# Patient Record
Sex: MALE | Race: WHITE
[De-identification: names, ages, dates, MRNs, and addresses within clinical notes are randomized per-mention and may not be internally consistent; named-entity substitution may affect disease eponyms.]

---

## 2019-06-10 ENCOUNTER — HOSPITAL ENCOUNTER (INPATIENT)
Dept: HOSPITAL 8 - ED | Age: 49
LOS: 1 days | Discharge: PSYCHIATRIC OR SUBSTANCE USE FACILITY | DRG: 876 | End: 2019-06-11
Attending: INTERNAL MEDICINE | Admitting: HOSPITALIST
Payer: MEDICAID

## 2019-06-10 VITALS — WEIGHT: 171.96 LBS | BODY MASS INDEX: 25.47 KG/M2 | HEIGHT: 69 IN

## 2019-06-10 VITALS — SYSTOLIC BLOOD PRESSURE: 110 MMHG | DIASTOLIC BLOOD PRESSURE: 77 MMHG

## 2019-06-10 VITALS — SYSTOLIC BLOOD PRESSURE: 100 MMHG | DIASTOLIC BLOOD PRESSURE: 64 MMHG

## 2019-06-10 DIAGNOSIS — F12.90: ICD-10-CM

## 2019-06-10 DIAGNOSIS — F32.2: Primary | ICD-10-CM

## 2019-06-10 DIAGNOSIS — Z59.0: ICD-10-CM
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
## 2019-06-10 DIAGNOSIS — S51.811A: ICD-10-CM

## 2019-06-10 DIAGNOSIS — S61.512A: ICD-10-CM

## 2019-06-10 DIAGNOSIS — S61.511A: ICD-10-CM
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
## 2019-06-10 DIAGNOSIS — S51.812A: ICD-10-CM

## 2019-06-10 LAB
ALBUMIN SERPL-MCNC: 3.8 G/DL (ref 3.4–5)
ALP SERPL-CCNC: 85 U/L (ref 45–117)
ALT SERPL-CCNC: 18 U/L (ref 12–78)
ANION GAP SERPL CALC-SCNC: 7 MMOL/L (ref 5–15)
BASOPHILS # BLD AUTO: 0.01 X10^3/UL (ref 0–0.1)
BASOPHILS NFR BLD AUTO: 0 % (ref 0–1)
BILIRUB SERPL-MCNC: 0.3 MG/DL (ref 0.2–1)
CALCIUM SERPL-MCNC: 8.8 MG/DL (ref 8.5–10.1)
CHLORIDE SERPL-SCNC: 111 MMOL/L (ref 98–107)
CREAT SERPL-MCNC: 0.9 MG/DL (ref 0.7–1.3)
EOSINOPHIL # BLD AUTO: 0.03 X10^3/UL (ref 0–0.4)
EOSINOPHIL NFR BLD AUTO: 0 % (ref 1–7)
ERYTHROCYTE [DISTWIDTH] IN BLOOD BY AUTOMATED COUNT: 13.2 % (ref 9.4–14.8)
LYMPHOCYTES # BLD AUTO: 1.6 X10^3/UL (ref 1–3.4)
LYMPHOCYTES NFR BLD AUTO: 15 % (ref 22–44)
MCH RBC QN AUTO: 30.4 PG (ref 27.5–34.5)
MCHC RBC AUTO-ENTMCNC: 33.4 G/DL (ref 33.2–36.2)
MCV RBC AUTO: 91.1 FL (ref 81–97)
MD: NO
MONOCYTES # BLD AUTO: 0.27 X10^3/UL (ref 0.2–0.8)
MONOCYTES NFR BLD AUTO: 3 % (ref 2–9)
NEUTROPHILS # BLD AUTO: 8.61 X10^3/UL (ref 1.8–6.8)
NEUTROPHILS NFR BLD AUTO: 82 % (ref 42–75)
PLATELET # BLD AUTO: 358 X10^3/UL (ref 130–400)
PMV BLD AUTO: 7.4 FL (ref 7.4–10.4)
PROT SERPL-MCNC: 7.4 G/DL (ref 6.4–8.2)
RBC # BLD AUTO: 4.96 X10^6/UL (ref 4.38–5.82)
VANCOMYCIN TROUGH SERPL-MCNC: 2.1 MG/DL (ref 2.8–20)

## 2019-06-10 PROCEDURE — 80053 COMPREHEN METABOLIC PANEL: CPT

## 2019-06-10 PROCEDURE — 84443 ASSAY THYROID STIM HORMONE: CPT

## 2019-06-10 PROCEDURE — 96372 THER/PROPH/DIAG INJ SC/IM: CPT

## 2019-06-10 PROCEDURE — 85025 COMPLETE CBC W/AUTO DIFF WBC: CPT

## 2019-06-10 PROCEDURE — 80307 DRUG TEST PRSMV CHEM ANLYZR: CPT

## 2019-06-10 PROCEDURE — 36415 COLL VENOUS BLD VENIPUNCTURE: CPT

## 2019-06-10 PROCEDURE — 90715 TDAP VACCINE 7 YRS/> IM: CPT

## 2019-06-10 PROCEDURE — 0JQH0ZZ REPAIR LEFT LOWER ARM SUBCUTANEOUS TISSUE AND FASCIA, OPEN APPROACH: ICD-10-PCS | Performed by: EMERGENCY MEDICINE

## 2019-06-10 PROCEDURE — 0JQG0ZZ REPAIR RIGHT LOWER ARM SUBCUTANEOUS TISSUE AND FASCIA, OPEN APPROACH: ICD-10-PCS | Performed by: EMERGENCY MEDICINE

## 2019-06-10 RX ADMIN — ACETAMINOPHEN PRN MG: 325 TABLET, FILM COATED ORAL at 16:54

## 2019-06-10 SDOH — ECONOMIC STABILITY - HOUSING INSECURITY: HOMELESSNESS: Z59.0

## 2019-06-10 NOTE — NUR
Provided report to DONNA Harkins on 2N. All questions answered. Pt ready to 
transfer to floor from ED.

## 2019-06-10 NOTE — NUR
Pt resting on gurney. Pt's brother at bedside. Pt gives verbal consent for his 
brother to be in the room. NADN. No needs expressed. Sitter near doorway in 
direct line of sight for observation.

## 2019-06-10 NOTE — NUR
ED tech at bedside placing dressing per EDPA verbal order on pt's bilateral 
forearms. NADN. Room remains secured for SI precautions. Pt provided water per 
request. Pt provided urinal and is aware urine sample is needed. Sitter near 
doorway in direct line of sight for observation. NADN. No other needs 
experssed.

## 2019-06-10 NOTE — NUR
LATE NOTE FOR 1330:



Pt resting on gurney. Pt has unlabored respirations with even chest rise and 
fall. Sitter near doorway in direct line of sight for observation. NADN. No 
needs expressed. Both bedrails up for safety measures.

## 2019-06-10 NOTE — NUR
Pt provided lunch tray by sinan. JACQUELINE. No other needs expressed at this time. 
Sitter near doorway in direct line of sight for observation.

## 2019-06-10 NOTE — NUR
Pt resting on gurney. NADN. Pt has unlabored respirations with even chest rise 
and fall. Pt's forearms remain bandaged after sutures and staples placed by 
EDPA. Lunch tray ordered. Pt provided urine sample. UA sent to lab. Sitter in 
direct line of sight for observation.

## 2019-06-10 NOTE — NUR
Pt transfered to  from ED in wheelchair and left with all personal 
belongings. NADN. No needs expressed.

## 2019-06-10 NOTE — NUR
PT BIB [facility] WHERE HE WAS FOUND AT A [employer] LYING ON THE GROUND. PT WITH 
BILATERAL, DEEP WRIST LACERATIONS. PT STATED " I WANT TO NOT WAKE UP ANYMORE, 
WHY DID I NOT KILL MYSELF."  " IM DONE, IM DONE." PT STATED HE WAS HOMELESS, 
JOBLESS AND HIS WIFE  HIM. PT VERY DEPRESSED. PT STATED " I DONT WANT 
TO WAKE UP TOMORROW, HOW COULD I HAVE NOT KILLED MYSELF."  ASSESSMENT 
COMPLETED. BELONGINGS TAKEN AND LOCKED UP. PT PLACED IN GOWN. ROOMD SECURED 
WITH DOORS DOWN. SITTER OUTSIDE ROOM. REPORT GIVEN TO ROBERTO THOMPSON.

## 2019-06-11 VITALS — DIASTOLIC BLOOD PRESSURE: 65 MMHG | SYSTOLIC BLOOD PRESSURE: 96 MMHG

## 2019-06-11 RX ADMIN — ACETAMINOPHEN PRN MG: 325 TABLET, FILM COATED ORAL at 12:17

## 2022-09-26 ENCOUNTER — HOSPITAL ENCOUNTER (EMERGENCY)
Facility: MEDICAL CENTER | Age: 52
End: 2022-09-27
Attending: EMERGENCY MEDICINE

## 2022-09-26 DIAGNOSIS — S00.81XA ABRASION OF FACE, INITIAL ENCOUNTER: ICD-10-CM

## 2022-09-26 DIAGNOSIS — S01.81XA FACIAL LACERATION, INITIAL ENCOUNTER: ICD-10-CM
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
## 2022-09-27 VITALS
TEMPERATURE: 98 F | SYSTOLIC BLOOD PRESSURE: 98 MMHG | WEIGHT: 236.33 LBS | HEART RATE: 92 BPM | DIASTOLIC BLOOD PRESSURE: 68 MMHG | HEIGHT: 69 IN | OXYGEN SATURATION: 94 % | RESPIRATION RATE: 16 BRPM | BODY MASS INDEX: 35 KG/M2

## 2022-09-27 PROCEDURE — 99282 EMERGENCY DEPT VISIT SF MDM: CPT

## 2022-09-27 PROCEDURE — 90471 IMMUNIZATION ADMIN: CPT

## 2022-09-27 PROCEDURE — 304999 HCHG REPAIR-SIMPLE/INTERMED LEVEL 1

## 2022-09-27 PROCEDURE — 303747 HCHG EXTRA SUTURE

## 2022-09-27 PROCEDURE — 90715 TDAP VACCINE 7 YRS/> IM: CPT | Performed by: EMERGENCY MEDICINE

## 2022-09-27 PROCEDURE — 700111 HCHG RX REV CODE 636 W/ 250 OVERRIDE (IP): Performed by: EMERGENCY MEDICINE

## 2022-09-27 PROCEDURE — 304217 HCHG IRRIGATION SYSTEM

## 2022-09-27 RX ADMIN — CLOSTRIDIUM TETANI TOXOID ANTIGEN (FORMALDEHYDE INACTIVATED), CORYNEBACTERIUM DIPHTHERIAE TOXOID ANTIGEN (FORMALDEHYDE INACTIVATED), BORDETELLA PERTUSSIS TOXOID ANTIGEN (GLUTARALDEHYDE INACTIVATED), BORDETELLA PERTUSSIS FILAMENTOUS HEMAGGLUTININ ANTIGEN (FORMALDEHYDE INACTIVATED), BORDETELLA PERTUSSIS PERTACTIN ANTIGEN, AND BORDETELLA PERTUSSIS FIMBRIAE 2/3 ANTIGEN 0.5 ML: 5; 2; 2.5; 5; 3; 5 INJECTION, SUSPENSION INTRAMUSCULAR at 00:39

## 2022-09-27 ASSESSMENT — PAIN DESCRIPTION - PAIN TYPE: TYPE: ACUTE PAIN

## 2022-09-27 NOTE — ED PROVIDER NOTES
"ED Provider Note      CHIEF COMPLAINT  Chief Complaint   Patient presents with    Laceration     Pt has laceration to L eyebrow and abrasions to L cheek after a ladder fell on him. Pt denies LOC       HPI  Star Gonzalez is a 52 y.o. male who presents to the emergency room for left-sided facial abrasions and a left brow laceration after the patient was standing on a ladder and lost his balance, falling down and striking part of the ladder with this portion of his head.  He had no loss of consciousness, was able to ambulate and subsequently has not had any debilitating truncal, abdominal or extremity complaints.  He has a very minimal amount of pain at the site, no vision changes, no jaw or facial pain, no headache.  He is not on blood thinners    Unknown last tetanus    REVIEW OF SYSTEMS  No numbness or weakness    PAST MEDICAL HISTORY  History reviewed. No pertinent past medical history.    SOCIAL HISTORY  Social History     Tobacco Use    Smoking status: Every Day     Packs/day: 0.50     Types: Cigarettes    Smokeless tobacco: Never   Vaping Use    Vaping Use: Never used   Substance Use Topics    Alcohol use: Never    Drug use: Yes     Types: Inhaled     Comment: marijuana-daily       CURRENT MEDICATIONS  Home Medications       Reviewed by Elsa Alonzo R.N. (Registered Nurse) on 09/26/22 at 2220  Med List Status: Not Addressed     Medication Last Dose Status        Patient Jonas Taking any Medications                           ALLERGIES  No Known Allergies    PHYSICAL EXAM  VITAL SIGNS: /89   Pulse (!) 106   Temp (!) 35.3 °C (95.5 °F) (Oral)   Resp 18   Ht 1.753 m (5' 9\")   Wt 107 kg (236 lb 5.3 oz)   SpO2 99%   BMI 34.90 kg/m²    Constitutional: No distress  HENT:  Atraumatic   Eyes: Normal inspection  Cardiovascular: Normal heart rate  Thorax & Lungs: No respiratory distress  Skin: 2 cm macerated compressed laceration over the mid to lateral aspect of the brow.  No gross vascular abnormalities, no " regional injury to the superior orbital notch.  No pain with eye closure and movements.  No facial tenderness with palpation  Extremities: As mentioned above  Neurologic: No numbness or tingling  Psychiatric: Affect normal    COURSE & MEDICAL DECISION MAKING  Patient is neurovascularly intact. No foreign bodies. No motor dysfunction.    Laceration Repair Procedure Note  Indication: Laceration  Procedure: The patient was placed in the appropriate position and anesthesia around the laceration was obtained by infiltration using 1% Lidocaine without epinephrine. The area was then irrigated with normal saline. The laceration was closed with 10x 6-0 Ethilon using interrupted sutures. There were no additional lacerations requiring repair. The wound area was then dressed with bacitracin and a sterile dressing.  Tetanus updated here  Total repaired wound length: 2.5 cm.     The patient tolerated the procedure well.    Patient instructed to have his stitches out in 7 days. Should wash wound twice daily. Keep clean at all times. Return to the emergency department for any signs of infection, including: Fevers, expanding redness, purulent drainage.    FINAL IMPRESSION  1. Laceration  2. Facial abrasions  3. Fall from ladder    Blood pressure reviewed and likely elevated secondary to medical condition. Advised home monitoring and followup.      This dictation was created using voice recognition software. The accuracy of the dictation is limited to the abilities of the software. I expect there may be some errors of grammar and possibly content. The nursing notes were reviewed and certain aspects of this information were incorporated into this note.    Electronically signed by: Pedro Luis Jefferson M.D., 9/27/2022 12:01 AM

## 2025-01-16 ENCOUNTER — HOSPITAL ENCOUNTER (OUTPATIENT)
Facility: MEDICAL CENTER | Age: 55
End: 2025-01-18
Attending: STUDENT IN AN ORGANIZED HEALTH CARE EDUCATION/TRAINING PROGRAM | Admitting: INTERNAL MEDICINE
Payer: MEDICAID

## 2025-01-16 DIAGNOSIS — E11.10: ICD-10-CM

## 2025-01-16 DIAGNOSIS — E86.0 DEHYDRATION: ICD-10-CM

## 2025-01-16 DIAGNOSIS — E11.10 DIABETIC KETOACIDOSIS WITHOUT COMA ASSOCIATED WITH TYPE 2 DIABETES MELLITUS (HCC): ICD-10-CM

## 2025-01-16 LAB
APPEARANCE UR: CLEAR
BASOPHILS # BLD AUTO: 0.5 % (ref 0–1.8)
BASOPHILS # BLD: 0.04 K/UL (ref 0–0.12)
BILIRUB UR QL STRIP.AUTO: NEGATIVE
COLOR UR: YELLOW
EOSINOPHIL # BLD AUTO: 0.07 K/UL (ref 0–0.51)
EOSINOPHIL NFR BLD: 0.8 % (ref 0–6.9)
ERYTHROCYTE [DISTWIDTH] IN BLOOD BY AUTOMATED COUNT: 40.8 FL (ref 35.9–50)
GLUCOSE BLD STRIP.AUTO-MCNC: >600 MG/DL (ref 65–99)
GLUCOSE UR STRIP.AUTO-MCNC: >=1000 MG/DL
HCT VFR BLD AUTO: 47.8 % (ref 42–52)
HGB BLD-MCNC: 16.4 G/DL (ref 14–18)
IMM GRANULOCYTES # BLD AUTO: 0.04 K/UL (ref 0–0.11)
IMM GRANULOCYTES NFR BLD AUTO: 0.5 % (ref 0–0.9)
KETONES UR STRIP.AUTO-MCNC: 15 MG/DL
LEUKOCYTE ESTERASE UR QL STRIP.AUTO: NEGATIVE
LYMPHOCYTES # BLD AUTO: 2.15 K/UL (ref 1–4.8)
LYMPHOCYTES NFR BLD: 24.3 % (ref 22–41)
MCH RBC QN AUTO: 29.9 PG (ref 27–33)
MCHC RBC AUTO-ENTMCNC: 34.3 G/DL (ref 32.3–36.5)
MCV RBC AUTO: 87.1 FL (ref 81.4–97.8)
MICRO URNS: ABNORMAL
MONOCYTES # BLD AUTO: 0.59 K/UL (ref 0–0.85)
MONOCYTES NFR BLD AUTO: 6.7 % (ref 0–13.4)
NEUTROPHILS # BLD AUTO: 5.97 K/UL (ref 1.82–7.42)
NEUTROPHILS NFR BLD: 67.2 % (ref 44–72)
NITRITE UR QL STRIP.AUTO: NEGATIVE
NRBC # BLD AUTO: 0 K/UL
NRBC BLD-RTO: 0 /100 WBC (ref 0–0.2)
PH UR STRIP.AUTO: 5 [PH] (ref 5–8)
PLATELET # BLD AUTO: 336 K/UL (ref 164–446)
PMV BLD AUTO: 10.6 FL (ref 9–12.9)
PROT UR QL STRIP: NEGATIVE MG/DL
RBC # BLD AUTO: 5.49 M/UL (ref 4.7–6.1)
RBC UR QL AUTO: NEGATIVE
SP GR UR STRIP.AUTO: 1.04
UROBILINOGEN UR STRIP.AUTO-MCNC: 0.2 EU/DL
WBC # BLD AUTO: 8.9 K/UL (ref 4.8–10.8)

## 2025-01-16 PROCEDURE — 83690 ASSAY OF LIPASE: CPT

## 2025-01-16 PROCEDURE — 99285 EMERGENCY DEPT VISIT HI MDM: CPT

## 2025-01-16 PROCEDURE — 700105 HCHG RX REV CODE 258: Mod: UD | Performed by: STUDENT IN AN ORGANIZED HEALTH CARE EDUCATION/TRAINING PROGRAM

## 2025-01-16 PROCEDURE — 84100 ASSAY OF PHOSPHORUS: CPT

## 2025-01-16 PROCEDURE — 81003 URINALYSIS AUTO W/O SCOPE: CPT

## 2025-01-16 PROCEDURE — 80053 COMPREHEN METABOLIC PANEL: CPT

## 2025-01-16 PROCEDURE — 93005 ELECTROCARDIOGRAM TRACING: CPT | Mod: TC | Performed by: STUDENT IN AN ORGANIZED HEALTH CARE EDUCATION/TRAINING PROGRAM

## 2025-01-16 PROCEDURE — 83735 ASSAY OF MAGNESIUM: CPT

## 2025-01-16 PROCEDURE — 85025 COMPLETE CBC W/AUTO DIFF WBC: CPT

## 2025-01-16 PROCEDURE — 36415 COLL VENOUS BLD VENIPUNCTURE: CPT

## 2025-01-16 PROCEDURE — 82010 KETONE BODYS QUAN: CPT

## 2025-01-16 PROCEDURE — 83036 HEMOGLOBIN GLYCOSYLATED A1C: CPT

## 2025-01-16 PROCEDURE — 82962 GLUCOSE BLOOD TEST: CPT

## 2025-01-16 RX ORDER — SODIUM CHLORIDE, SODIUM LACTATE, POTASSIUM CHLORIDE, CALCIUM CHLORIDE 600; 310; 30; 20 MG/100ML; MG/100ML; MG/100ML; MG/100ML
1000 INJECTION, SOLUTION INTRAVENOUS ONCE
Status: COMPLETED | OUTPATIENT
Start: 2025-01-16 | End: 2025-01-17

## 2025-01-16 RX ADMIN — SODIUM CHLORIDE, POTASSIUM CHLORIDE, SODIUM LACTATE AND CALCIUM CHLORIDE 1000 ML: 600; 310; 30; 20 INJECTION, SOLUTION INTRAVENOUS at 23:00

## 2025-01-17 PROBLEM — E11.10 DIABETES MELLITUS TYPE 2 WITH KETOACIDOSIS, UNCONTROLLED (HCC): Status: ACTIVE | Noted: 2025-01-17

## 2025-01-17 PROBLEM — E10.10 DKA, TYPE 1, NOT AT GOAL (HCC): Status: ACTIVE | Noted: 2025-01-17

## 2025-01-17 PROBLEM — F17.200 SMOKING: Status: ACTIVE | Noted: 2025-01-17

## 2025-01-17 PROBLEM — E87.1 HYPONATREMIA: Status: ACTIVE | Noted: 2025-01-17

## 2025-01-17 PROBLEM — I10 HYPERTENSION: Status: ACTIVE | Noted: 2025-01-17

## 2025-01-17 LAB
ALBUMIN SERPL BCP-MCNC: 4.2 G/DL (ref 3.2–4.9)
ALBUMIN/GLOB SERPL: 1.3 G/DL
ALP SERPL-CCNC: 183 U/L (ref 30–99)
ALT SERPL-CCNC: 34 U/L (ref 2–50)
ANION GAP SERPL CALC-SCNC: 14 MMOL/L (ref 7–16)
ANION GAP SERPL CALC-SCNC: 20 MMOL/L (ref 7–16)
ANION GAP SERPL CALC-SCNC: 9 MMOL/L (ref 7–16)
AST SERPL-CCNC: 24 U/L (ref 12–45)
B-OH-BUTYR SERPL-MCNC: 1.22 MMOL/L (ref 0.02–0.27)
BASE EXCESS BLDV CALC-SCNC: 1 MMOL/L (ref -2–3)
BILIRUB SERPL-MCNC: 0.2 MG/DL (ref 0.1–1.5)
BODY TEMPERATURE: 36.9 CENTIGRADE
BUN SERPL-MCNC: 16 MG/DL (ref 8–22)
BUN SERPL-MCNC: 19 MG/DL (ref 8–22)
BUN SERPL-MCNC: 20 MG/DL (ref 8–22)
CALCIUM ALBUM COR SERPL-MCNC: 9 MG/DL (ref 8.5–10.5)
CALCIUM SERPL-MCNC: 8.3 MG/DL (ref 8.5–10.5)
CALCIUM SERPL-MCNC: 8.6 MG/DL (ref 8.5–10.5)
CALCIUM SERPL-MCNC: 9.2 MG/DL (ref 8.5–10.5)
CHLORIDE SERPL-SCNC: 86 MMOL/L (ref 96–112)
CHLORIDE SERPL-SCNC: 94 MMOL/L (ref 96–112)
CHLORIDE SERPL-SCNC: 98 MMOL/L (ref 96–112)
CO2 SERPL-SCNC: 18 MMOL/L (ref 20–33)
CO2 SERPL-SCNC: 18 MMOL/L (ref 20–33)
CO2 SERPL-SCNC: 25 MMOL/L (ref 20–33)
CREAT SERPL-MCNC: 0.79 MG/DL (ref 0.5–1.4)
CREAT SERPL-MCNC: 0.8 MG/DL (ref 0.5–1.4)
CREAT SERPL-MCNC: 0.86 MG/DL (ref 0.5–1.4)
EKG IMPRESSION: NORMAL
EST. AVERAGE GLUCOSE BLD GHB EST-MCNC: 464 MG/DL
GFR SERPLBLD CREATININE-BSD FMLA CKD-EPI: 101 ML/MIN/1.73 M 2
GFR SERPLBLD CREATININE-BSD FMLA CKD-EPI: 104 ML/MIN/1.73 M 2
GFR SERPLBLD CREATININE-BSD FMLA CKD-EPI: 104 ML/MIN/1.73 M 2
GLOBULIN SER CALC-MCNC: 3.2 G/DL (ref 1.9–3.5)
GLUCOSE BLD STRIP.AUTO-MCNC: 325 MG/DL (ref 65–99)
GLUCOSE BLD STRIP.AUTO-MCNC: 326 MG/DL (ref 65–99)
GLUCOSE BLD STRIP.AUTO-MCNC: 387 MG/DL (ref 65–99)
GLUCOSE BLD STRIP.AUTO-MCNC: 438 MG/DL (ref 65–99)
GLUCOSE BLD STRIP.AUTO-MCNC: 463 MG/DL (ref 65–99)
GLUCOSE SERPL-MCNC: 314 MG/DL (ref 65–99)
GLUCOSE SERPL-MCNC: 439 MG/DL (ref 65–99)
GLUCOSE SERPL-MCNC: 596 MG/DL (ref 65–99)
HBA1C MFR BLD: 17.8 % (ref 4–5.6)
HCO3 BLDV-SCNC: 26 MMOL/L (ref 22–29)
INHALED O2 FLOW RATE: ABNORMAL L/MIN
LIPASE SERPL-CCNC: 59 U/L (ref 11–82)
MAGNESIUM SERPL-MCNC: 2.2 MG/DL (ref 1.5–2.5)
PCO2 BLDV: 41.7 MMHG (ref 38–54)
PCO2 TEMP ADJ BLDV: 41.5 MMHG (ref 38–54)
PH BLDV: 7.41 [PH] (ref 7.31–7.45)
PH TEMP ADJ BLDV: 7.41 [PH] (ref 7.31–7.45)
PHOSPHATE SERPL-MCNC: 4.4 MG/DL (ref 2.5–4.5)
PO2 BLDV: 48.6 MMHG (ref 23–48)
PO2 TEMP ADJ BLDV: 48.3 MMHG (ref 23–48)
POTASSIUM SERPL-SCNC: 3.7 MMOL/L (ref 3.6–5.5)
POTASSIUM SERPL-SCNC: 4.4 MMOL/L (ref 3.6–5.5)
POTASSIUM SERPL-SCNC: 4.4 MMOL/L (ref 3.6–5.5)
PROT SERPL-MCNC: 7.4 G/DL (ref 6–8.2)
SAO2 % BLDV: 82.4 % (ref 60–85)
SODIUM SERPL-SCNC: 124 MMOL/L (ref 135–145)
SODIUM SERPL-SCNC: 126 MMOL/L (ref 135–145)
SODIUM SERPL-SCNC: 132 MMOL/L (ref 135–145)

## 2025-01-17 PROCEDURE — 99223 1ST HOSP IP/OBS HIGH 75: CPT | Mod: 25 | Performed by: INTERNAL MEDICINE

## 2025-01-17 PROCEDURE — A9270 NON-COVERED ITEM OR SERVICE: HCPCS | Mod: UD | Performed by: INTERNAL MEDICINE

## 2025-01-17 PROCEDURE — A9270 NON-COVERED ITEM OR SERVICE: HCPCS | Mod: UD | Performed by: STUDENT IN AN ORGANIZED HEALTH CARE EDUCATION/TRAINING PROGRAM

## 2025-01-17 PROCEDURE — 700102 HCHG RX REV CODE 250 W/ 637 OVERRIDE(OP): Performed by: INTERNAL MEDICINE

## 2025-01-17 PROCEDURE — 82962 GLUCOSE BLOOD TEST: CPT

## 2025-01-17 PROCEDURE — 700102 HCHG RX REV CODE 250 W/ 637 OVERRIDE(OP): Mod: UD | Performed by: STUDENT IN AN ORGANIZED HEALTH CARE EDUCATION/TRAINING PROGRAM

## 2025-01-17 PROCEDURE — 700105 HCHG RX REV CODE 258: Performed by: INTERNAL MEDICINE

## 2025-01-17 PROCEDURE — G0378 HOSPITAL OBSERVATION PER HR: HCPCS

## 2025-01-17 PROCEDURE — 96372 THER/PROPH/DIAG INJ SC/IM: CPT

## 2025-01-17 PROCEDURE — 99406 BEHAV CHNG SMOKING 3-10 MIN: CPT | Performed by: INTERNAL MEDICINE

## 2025-01-17 PROCEDURE — 36415 COLL VENOUS BLD VENIPUNCTURE: CPT

## 2025-01-17 PROCEDURE — 99406 BEHAV CHNG SMOKING 3-10 MIN: CPT

## 2025-01-17 PROCEDURE — 700105 HCHG RX REV CODE 258: Performed by: STUDENT IN AN ORGANIZED HEALTH CARE EDUCATION/TRAINING PROGRAM

## 2025-01-17 PROCEDURE — 82803 BLOOD GASES ANY COMBINATION: CPT

## 2025-01-17 PROCEDURE — 80048 BASIC METABOLIC PNL TOTAL CA: CPT

## 2025-01-17 RX ORDER — NICOTINE 21 MG/24HR
21 PATCH, TRANSDERMAL 24 HOURS TRANSDERMAL
Status: DISCONTINUED | OUTPATIENT
Start: 2025-01-17 | End: 2025-01-18 | Stop reason: HOSPADM

## 2025-01-17 RX ORDER — PROMETHAZINE HYDROCHLORIDE 25 MG/1
12.5-25 TABLET ORAL EVERY 4 HOURS PRN
Status: DISCONTINUED | OUTPATIENT
Start: 2025-01-17 | End: 2025-01-18 | Stop reason: HOSPADM

## 2025-01-17 RX ORDER — AMOXICILLIN 250 MG
2 CAPSULE ORAL EVERY EVENING
Status: DISCONTINUED | OUTPATIENT
Start: 2025-01-17 | End: 2025-01-18 | Stop reason: HOSPADM

## 2025-01-17 RX ORDER — SODIUM CHLORIDE 9 MG/ML
INJECTION, SOLUTION INTRAVENOUS CONTINUOUS
Status: DISCONTINUED | OUTPATIENT
Start: 2025-01-17 | End: 2025-01-17

## 2025-01-17 RX ORDER — INSULIN LISPRO 100 [IU]/ML
2-9 INJECTION, SOLUTION INTRAVENOUS; SUBCUTANEOUS EVERY 6 HOURS
Status: DISCONTINUED | OUTPATIENT
Start: 2025-01-17 | End: 2025-01-17

## 2025-01-17 RX ORDER — PROCHLORPERAZINE EDISYLATE 5 MG/ML
5-10 INJECTION INTRAMUSCULAR; INTRAVENOUS EVERY 4 HOURS PRN
Status: DISCONTINUED | OUTPATIENT
Start: 2025-01-17 | End: 2025-01-18 | Stop reason: HOSPADM

## 2025-01-17 RX ORDER — ONDANSETRON 2 MG/ML
4 INJECTION INTRAMUSCULAR; INTRAVENOUS EVERY 4 HOURS PRN
Status: DISCONTINUED | OUTPATIENT
Start: 2025-01-17 | End: 2025-01-18 | Stop reason: HOSPADM

## 2025-01-17 RX ORDER — HYDRALAZINE HYDROCHLORIDE 20 MG/ML
10 INJECTION INTRAMUSCULAR; INTRAVENOUS EVERY 4 HOURS PRN
Status: DISCONTINUED | OUTPATIENT
Start: 2025-01-17 | End: 2025-01-18 | Stop reason: HOSPADM

## 2025-01-17 RX ORDER — SODIUM CHLORIDE, SODIUM LACTATE, POTASSIUM CHLORIDE, CALCIUM CHLORIDE 600; 310; 30; 20 MG/100ML; MG/100ML; MG/100ML; MG/100ML
1000 INJECTION, SOLUTION INTRAVENOUS ONCE
Status: COMPLETED | OUTPATIENT
Start: 2025-01-17 | End: 2025-01-17

## 2025-01-17 RX ORDER — DEXTROSE MONOHYDRATE 25 G/50ML
25 INJECTION, SOLUTION INTRAVENOUS
Status: DISCONTINUED | OUTPATIENT
Start: 2025-01-17 | End: 2025-01-18 | Stop reason: HOSPADM

## 2025-01-17 RX ORDER — INSULIN LISPRO 100 [IU]/ML
10 INJECTION, SOLUTION INTRAVENOUS; SUBCUTANEOUS ONCE
Status: COMPLETED | OUTPATIENT
Start: 2025-01-17 | End: 2025-01-17

## 2025-01-17 RX ORDER — POTASSIUM CHLORIDE 1500 MG/1
40 TABLET, EXTENDED RELEASE ORAL ONCE
Status: COMPLETED | OUTPATIENT
Start: 2025-01-17 | End: 2025-01-17

## 2025-01-17 RX ORDER — ONDANSETRON 4 MG/1
4 TABLET, ORALLY DISINTEGRATING ORAL EVERY 4 HOURS PRN
Status: DISCONTINUED | OUTPATIENT
Start: 2025-01-17 | End: 2025-01-18 | Stop reason: HOSPADM

## 2025-01-17 RX ORDER — IBUPROFEN 200 MG
200-400 TABLET ORAL EVERY 6 HOURS PRN
Status: ON HOLD | COMMUNITY
End: 2025-01-18

## 2025-01-17 RX ORDER — ENOXAPARIN SODIUM 100 MG/ML
40 INJECTION SUBCUTANEOUS DAILY
Status: DISCONTINUED | OUTPATIENT
Start: 2025-01-17 | End: 2025-01-18 | Stop reason: HOSPADM

## 2025-01-17 RX ORDER — ACETAMINOPHEN 325 MG/1
650 TABLET ORAL EVERY 6 HOURS PRN
Status: DISCONTINUED | OUTPATIENT
Start: 2025-01-17 | End: 2025-01-18 | Stop reason: HOSPADM

## 2025-01-17 RX ORDER — PROMETHAZINE HYDROCHLORIDE 25 MG/1
12.5-25 SUPPOSITORY RECTAL EVERY 4 HOURS PRN
Status: DISCONTINUED | OUTPATIENT
Start: 2025-01-17 | End: 2025-01-18 | Stop reason: HOSPADM

## 2025-01-17 RX ORDER — POLYETHYLENE GLYCOL 3350 17 G/17G
1 POWDER, FOR SOLUTION ORAL
Status: DISCONTINUED | OUTPATIENT
Start: 2025-01-17 | End: 2025-01-18 | Stop reason: HOSPADM

## 2025-01-17 RX ORDER — INSULIN LISPRO 100 [IU]/ML
3-14 INJECTION, SOLUTION INTRAVENOUS; SUBCUTANEOUS
Status: DISCONTINUED | OUTPATIENT
Start: 2025-01-17 | End: 2025-01-18 | Stop reason: HOSPADM

## 2025-01-17 RX ORDER — ALPRAZOLAM 0.25 MG/1
0.25 TABLET ORAL NIGHTLY PRN
Status: DISCONTINUED | OUTPATIENT
Start: 2025-01-17 | End: 2025-01-18 | Stop reason: HOSPADM

## 2025-01-17 RX ORDER — INSULIN LISPRO 100 [IU]/ML
5 INJECTION, SOLUTION INTRAVENOUS; SUBCUTANEOUS
Status: DISCONTINUED | OUTPATIENT
Start: 2025-01-17 | End: 2025-01-18 | Stop reason: HOSPADM

## 2025-01-17 RX ADMIN — ACETAMINOPHEN 650 MG: 325 TABLET ORAL at 22:55

## 2025-01-17 RX ADMIN — INSULIN LISPRO 9 UNITS: 100 INJECTION, SOLUTION INTRAVENOUS; SUBCUTANEOUS at 13:24

## 2025-01-17 RX ADMIN — METFORMIN HYDROCHLORIDE 500 MG: 500 TABLET ORAL at 17:08

## 2025-01-17 RX ADMIN — INSULIN LISPRO 10 UNITS: 100 INJECTION, SOLUTION INTRAVENOUS; SUBCUTANEOUS at 17:09

## 2025-01-17 RX ADMIN — SODIUM CHLORIDE, POTASSIUM CHLORIDE, SODIUM LACTATE AND CALCIUM CHLORIDE 1000 ML: 600; 310; 30; 20 INJECTION, SOLUTION INTRAVENOUS at 01:35

## 2025-01-17 RX ADMIN — INSULIN LISPRO 5 UNITS: 100 INJECTION, SOLUTION INTRAVENOUS; SUBCUTANEOUS at 17:12

## 2025-01-17 RX ADMIN — INSULIN LISPRO 6 UNITS: 100 INJECTION, SOLUTION INTRAVENOUS; SUBCUTANEOUS at 05:32

## 2025-01-17 RX ADMIN — INSULIN GLARGINE-YFGN 22 UNITS: 100 INJECTION, SOLUTION SUBCUTANEOUS at 17:08

## 2025-01-17 RX ADMIN — SODIUM CHLORIDE: 9 INJECTION, SOLUTION INTRAVENOUS at 02:25

## 2025-01-17 RX ADMIN — ALPRAZOLAM 0.25 MG: 0.25 TABLET ORAL at 03:58

## 2025-01-17 RX ADMIN — INSULIN LISPRO 12 UNITS: 100 INJECTION, SOLUTION INTRAVENOUS; SUBCUTANEOUS at 21:57

## 2025-01-17 RX ADMIN — INSULIN LISPRO 10 UNITS: 100 INJECTION, SOLUTION INTRAVENOUS; SUBCUTANEOUS at 01:42

## 2025-01-17 RX ADMIN — INSULIN GLARGINE-YFGN 22 UNITS: 100 INJECTION, SOLUTION SUBCUTANEOUS at 02:19

## 2025-01-17 RX ADMIN — POTASSIUM CHLORIDE 40 MEQ: 1500 TABLET, EXTENDED RELEASE ORAL at 01:41

## 2025-01-17 RX ADMIN — NICOTINE TRANSDERMAL SYSTEM 21 MG: 21 PATCH, EXTENDED RELEASE TRANSDERMAL at 02:17

## 2025-01-17 ASSESSMENT — ENCOUNTER SYMPTOMS
VOMITING: 0
FLANK PAIN: 0
WEAKNESS: 1
HEADACHES: 0
COUGH: 0
WEIGHT LOSS: 0
DIZZINESS: 0
DOUBLE VISION: 0
ORTHOPNEA: 0
POLYDIPSIA: 0
PALPITATIONS: 0
FEVER: 0
ABDOMINAL PAIN: 1
NAUSEA: 0
NECK PAIN: 0
HALLUCINATIONS: 0
NERVOUS/ANXIOUS: 0
SPEECH CHANGE: 0
BACK PAIN: 0
DIARRHEA: 0
BRUISES/BLEEDS EASILY: 0
PHOTOPHOBIA: 0
SHORTNESS OF BREATH: 0
BLURRED VISION: 0
HEARTBURN: 0
HEMOPTYSIS: 0
NAUSEA: 1
CONSTIPATION: 0
CHILLS: 0
FOCAL WEAKNESS: 0
SPUTUM PRODUCTION: 0
TREMORS: 0

## 2025-01-17 ASSESSMENT — LIFESTYLE VARIABLES
AVERAGE NUMBER OF DAYS PER WEEK YOU HAVE A DRINK CONTAINING ALCOHOL: 0
EVER HAD A DRINK FIRST THING IN THE MORNING TO STEADY YOUR NERVES TO GET RID OF A HANGOVER: NO
TOTAL SCORE: 0
ALCOHOL_USE: NO
SUBSTANCE_ABUSE: 0
HAVE YOU EVER FELT YOU SHOULD CUT DOWN ON YOUR DRINKING: NO
EVER FELT BAD OR GUILTY ABOUT YOUR DRINKING: NO
TOTAL SCORE: 0
CONSUMPTION TOTAL: NEGATIVE
TOTAL SCORE: 0
ON A TYPICAL DAY WHEN YOU DRINK ALCOHOL HOW MANY DRINKS DO YOU HAVE: 0
HOW MANY TIMES IN THE PAST YEAR HAVE YOU HAD 5 OR MORE DRINKS IN A DAY: 0
DOES PATIENT WANT TO STOP DRINKING: NO
HAVE PEOPLE ANNOYED YOU BY CRITICIZING YOUR DRINKING: NO

## 2025-01-17 ASSESSMENT — COGNITIVE AND FUNCTIONAL STATUS - GENERAL
SUGGESTED CMS G CODE MODIFIER MOBILITY: CH
SUGGESTED CMS G CODE MODIFIER DAILY ACTIVITY: CH
DAILY ACTIVITIY SCORE: 24
MOBILITY SCORE: 24

## 2025-01-17 ASSESSMENT — PAIN DESCRIPTION - PAIN TYPE: TYPE: ACUTE PAIN

## 2025-01-17 NOTE — ASSESSMENT & PLAN NOTE
Mr. Gonzalez has been smoking.  We discussed smoking cessation for 4 minutes, including risks of smoking and benefits of quitting smoking.  Ordered nicotine patch

## 2025-01-17 NOTE — ED NOTES
Bedside report received from previous shift.   Assumed patient care. Verified patient identification.  Checked on bed, connected to monitor,  with unlabored respirations. Discussed plan of care.   Vital signs is stable. Denied any new complaints.   Gurney in low position, side rail up for pt safety. Call light within reach.   No needs identified at the moment. Instructed to use call light when needed.    Contraptions: IV gauge 20 RT AC  Alert and Oriented: x 4  Ambulatory: yes  Oxygen Treatment: None   Pending: Admission

## 2025-01-17 NOTE — HOSPITAL COURSE
Star Gonzalez is a 54 year old male with past medical history of tobacco use who presented 1/16/2025 with complaints of generalized weakness, polydipsia, polyuria, and decreased appetite for 2 weeks. patient stated he has been having some vague diffuse abdominal discomfort.  Denies constipation or diarrhea.  He presented with blood pressure 156/106, heart rate 108, appearing dehydrated.  Chemistry panel showed glucose 596, bicarb 18, anion gap 20, sodium 124, corrected sodium 133.  He denies prior history of diabetes.  UA showed ketones and glucose, no bacteria or leukocyte esterase.  He was given 10 units of lispro insulin subcutaneously, ringer lactate 2 L, potassium 40 mg and admitted for severe hyperglycemia/HHS.    His HbA1C is elevated at 17.8. He has been started on Insulin Glargine 22 Units, Insulin Lispro TID and Metformin 500 mg BID

## 2025-01-17 NOTE — ED NOTES
Pt is asking for medication to help him sleep  Notified to the hospitalist, Dr Ya via SMS awaiting for response

## 2025-01-17 NOTE — ASSESSMENT & PLAN NOTE
54-year-old male presented with polydipsia polyuria, generalized weakness, blood sugar 596, bicarb of 18, anion gap 20, elevated BHB 1.2, consistent with new onset diabetes with DKA.  BHB 1.22, A1c 17.8  Started on subcu insulin sliding scale, Lantus 22 units, meal time 5 units TID, high dose sliding scale   Status post Ringer lactate boluses 2 L  diabetic education  Cho diet   I have started metformin 500 mg twice daily

## 2025-01-17 NOTE — ASSESSMENT & PLAN NOTE
Pseudohyponatremia 126, corrected 131 for hyperglycemia  Hold on further fluids at this time  Blood sugar control  Follow-up BMP

## 2025-01-17 NOTE — ED TRIAGE NOTES
"Chief Complaint   Patient presents with    Other     Pt reports concern for new onset diabetes. Pt reports thirst, generalized abdominal discomfort intermittent positional, difficulty eating, urinary frequency, feeling fatigued symptoms started x4 wks ago.   Pt has No PCP     Pt ambulatory with steady gait  triage room.   Pt is GCS 15, speaking in full sentences, follows commands and responds appropriately to questions. Resp are even and unlabored.   Pt educated on triage process, updated/ agreeable to plan of care.     BP (!) 156/106   Pulse (!) 108   Temp 36.2 °C (97.2 °F) (Temporal)   Resp 18   Ht 1.727 m (5' 8\")   Wt 109 kg (240 lb)   SpO2 94%   BMI 36.49 kg/m²     "

## 2025-01-17 NOTE — DISCHARGE PLANNING
Patient status updated to OBS per attending physician, Hodan Gonsalez MD and UR committee physician secondary reviewer, Omar Gee MD.  Patient Status Update completed.

## 2025-01-17 NOTE — H&P
Hospital Medicine History & Physical Note    Date of Service  1/17/2025    Primary Care Physician  Pcp Pt States None        Code Status  Full Code    Chief Complaint  Chief Complaint   Patient presents with    Other     Pt reports concern for new onset diabetes. Pt reports thirst, generalized abdominal discomfort intermittent positional, difficulty eating d/t loss of appetite, urinary frequency, feeling fatigued symptoms started x4 wks ago.   Pt has No PCP       History of Presenting Illness  Star Gonzalez is a 54 y.o. male with history of smoking cigarettes, who presented 1/16/2025 with complaints of generalized weakness, polydipsia, polyuria, decreased appetite for 2 weeks patient stated he has been having some vague diffuse abdominal discomfort.  Denies constipation or diarrhea.  .  He presented with blood pressure 156/106, heart rate 108, appearing dehydrated.  Chemistry panel showed glucose 596, bicarb 18, anion gap 20, sodium 124, corrected sodium 133.  He denies prior history of diabetes.  UA showed ketones and glucose, no bacteria or leukocyte esterase.  He was given 10 units of lispro insulin subcutaneously, ringer lactate 2 L, potassium 40 mg    I discussed the plan of care with patient and ERP .    Review of Systems  Review of Systems   Constitutional:  Positive for malaise/fatigue. Negative for chills, fever and weight loss.   HENT:  Negative for ear pain, hearing loss and tinnitus.    Eyes:  Negative for blurred vision, double vision and photophobia.   Respiratory:  Negative for cough, hemoptysis and sputum production.    Cardiovascular:  Negative for chest pain, palpitations and orthopnea.   Gastrointestinal:  Positive for abdominal pain. Negative for constipation, diarrhea, heartburn, nausea and vomiting.   Genitourinary:  Positive for frequency. Negative for dysuria, flank pain and hematuria.   Musculoskeletal:  Negative for back pain, joint pain and neck pain.   Skin:  Negative for itching and rash.    Neurological:  Positive for weakness. Negative for tremors, speech change, focal weakness and headaches.   Endo/Heme/Allergies:  Negative for environmental allergies and polydipsia. Does not bruise/bleed easily.   Psychiatric/Behavioral:  Negative for hallucinations and substance abuse. The patient is not nervous/anxious.        Past Medical History   has no past medical history on file.    Surgical History   has no past surgical history on file.     Family History  family history is not on file.   Family history reviewed with patient. There is no family history that is pertinent to the chief complaint.     Social History   reports that he has been smoking cigarettes. He has never used smokeless tobacco. He reports current drug use. Drug: Inhaled. He reports that he does not drink alcohol.    Allergies  No Known Allergies    Medications  None       Physical Exam  Temp:  [36.2 °C (97.2 °F)] 36.2 °C (97.2 °F)  Pulse:  [] 105  Resp:  [18] 18  BP: (132-162)/() 162/98  SpO2:  [91 %-96 %] 96 %  Blood Pressure: (!) 162/98   Temperature: 36.2 °C (97.2 °F)   Pulse: (!) 105   Respiration: 18   Pulse Oximetry: 96 %       Physical Exam  Vitals and nursing note reviewed.   Constitutional:       General: He is not in acute distress.     Appearance: Normal appearance.   HENT:      Head: Normocephalic and atraumatic.      Nose: Nose normal.      Mouth/Throat:      Mouth: Mucous membranes are dry.   Eyes:      Extraocular Movements: Extraocular movements intact.      Pupils: Pupils are equal, round, and reactive to light.   Cardiovascular:      Rate and Rhythm: Normal rate and regular rhythm.   Pulmonary:      Effort: Pulmonary effort is normal.      Breath sounds: Normal breath sounds.   Abdominal:      General: Abdomen is flat. There is no distension.      Tenderness: There is no abdominal tenderness.   Musculoskeletal:         General: No swelling or deformity. Normal range of motion.      Cervical back: Normal  "range of motion and neck supple.   Skin:     General: Skin is warm and dry.   Neurological:      General: No focal deficit present.      Mental Status: He is alert and oriented to person, place, and time.   Psychiatric:         Mood and Affect: Mood normal.         Behavior: Behavior normal.         Laboratory:  Recent Labs     01/16/25 2202   WBC 8.9   RBC 5.49   HEMOGLOBIN 16.4   HEMATOCRIT 47.8   MCV 87.1   MCH 29.9   MCHC 34.3   RDW 40.8   PLATELETCT 336   MPV 10.6     Recent Labs     01/16/25 2202   SODIUM 124*   POTASSIUM 4.4   CHLORIDE 86*   CO2 18*   GLUCOSE 596*   BUN 19   CREATININE 0.79   CALCIUM 9.2     Recent Labs     01/16/25 2202   ALTSGPT 34   ASTSGOT 24   ALKPHOSPHAT 183*   TBILIRUBIN 0.2   LIPASE 59   GLUCOSE 596*         No results for input(s): \"NTPROBNP\" in the last 72 hours.      No results for input(s): \"TROPONINT\" in the last 72 hours.    Imaging:  No orders to display           Assessment/Plan:  Justification for Admission Status  I anticipate this patient will require at least two midnights for appropriate medical management, necessitating inpatient admission because diabetes ketoacidosis    Patient will need a Telemetry bed on MEDICAL service .  The need is secondary to diabetic ketoacidosis.    * DKA, type 1, not at goal (HCC), new onset diabetes- (present on admission)  Assessment & Plan  54-year-old male presented with polydipsia polyuria, generalized weakness, blood sugar 596, bicarb of 18, anion gap 20, consistent with new onset diabetes with DKA.  BHB 1.22, A1c 17.8  Started on subcu insulin sliding scale, Lantus 22 units.  Status post Ringer lactate boluses 2 L  Repeat BMP every 6 hours twice  Order diabetic education    Smoking  Assessment & Plan  Mr. Gonzalez has been smoking.  We discussed smoking cessation for 4 minutes, including risks of smoking and benefits of quitting smoking.  Ordered nicotine patch    Hypertension  Assessment & Plan  Monitor, consider starting scheduled " blood pressure medication        VTE prophylaxis: enoxaparin ppx

## 2025-01-17 NOTE — ED PROVIDER NOTES
ED Provider Note    CHIEF COMPLAINT  Chief Complaint   Patient presents with    Other     Pt reports concern for new onset diabetes. Pt reports thirst, generalized abdominal discomfort intermittent positional, difficulty eating d/t loss of appetite, urinary frequency, feeling fatigued symptoms started x4 wks ago.   Pt has No PCP       EXTERNAL RECORDS REVIEWED  Outpatient Notes only prior available records including ER visit for laceration repair in 2022.    HPI/ROS  LIMITATION TO HISTORY   Select: : None      Star Gonzalez is a 54 y.o. male who presents to the emergency department for evaluation of polydipsia and polyuria for 2 weeks.  He states that he feels completely dehydrated with [place] and dry chapped lips despite drinking constantly.  He reports some progressive abdominal distention as well.  He denies nausea, vomiting, abdominal pain, chest pain, shortness of breath, fevers, chills.  He denies any acute illnesses preceding onset of the symptoms.  He is otherwise healthy and does not take medicine regularly.    PAST MEDICAL HISTORY   No medical problems    SURGICAL HISTORY  patient denies any surgical history    FAMILY HISTORY  History reviewed. No pertinent family history.    SOCIAL HISTORY  Social History     Tobacco Use    Smoking status: Every Day     Current packs/day: 0.50     Types: Cigarettes    Smokeless tobacco: Never   Vaping Use    Vaping status: Never Used   Substance and Sexual Activity    Alcohol use: Never    Drug use: Yes     Types: Inhaled     Comment: marijuana-daily    Sexual activity: Not on file       CURRENT MEDICATIONS  Home Medications       Reviewed by Camille Song R.N. (Registered Nurse) on 01/16/25 at 4542  Med List Status: Partial     Medication Last Dose Status        Patient Jonas Taking any Medications                           ALLERGIES  No Known Allergies    PHYSICAL EXAM  VITAL SIGNS: BP (!) 156/106   Pulse (!) 108   Temp 36.2 °C (97.2 °F) (Temporal)   Resp 18    "Ht 1.727 m (5' 8\")   Wt 109 kg (240 lb)   SpO2 94%   BMI 36.49 kg/m²    Constitutional: No acute distress, pleasant, conversive, well.  HEENT: Atraumatic, normocephalic, pupils are equal round reactive to light, nose normal, mouth shows dry mucous membranes  Cardiovascular: Tachycardic, regular.  Thorax & Lungs: No respiratory distress, no wheezes, rales or rhonchi, no chest wall tenderness.  GI: Soft, distended, nontender  Skin: Warm, dry, no acute rash or lesion  Musculoskeletal: Moving all extremities, no acute deformity, no edema, no tenderness  Neurologic: A&Ox3, at baseline mentation, cranial nerves II through XII are grossly intact, no sensory deficit, no ataxia  Psychiatric: Appropriate affect for situation at this time        EKG/LABS  Labs Reviewed   URINALYSIS - Abnormal; Notable for the following components:       Result Value    Glucose >=1000 (*)     Ketones 15 (*)     All other components within normal limits   POCT GLUCOSE DEVICE RESULTS - Abnormal; Notable for the following components:    POC Glucose, Blood >600 (*)     All other components within normal limits   CBC WITH DIFFERENTIAL   COMP METABOLIC PANEL   LIPASE   POCT GLUCOSE     Results for orders placed or performed during the hospital encounter of 25   EKG (Now)   Result Value Ref Range    Report       [facility] Emergency Dept.    Test Date:  2025  Pt Name:    ZACH VELASCO                 Department: ER  MRN:        [id number]                      Room:        18  Gender:     Male                         Technician: [id number]  :        1970                   Requested By:MEDARDO MARROQUIN  Order #:    [id number]                    Reading MD:    Measurements  Intervals                                Axis  Rate:       97                           P:          32  NH:         153                          QRS:        -48  QRSD:       103                          T:          34  QT:         345  QTc:        " 439    Interpretive Statements  Pacemaker spikes or artifacts  Sinus rhythm  Abnormal R-wave progression, late transition  Inferior infarct, old  ST elevation, consider anterolateral injury  No previous ECG available for comparison         I have independently interpreted this EKG        COURSE & MEDICAL DECISION MAKING    ASSESSMENT, COURSE AND PLAN  Care Narrative:     Otherwise healthy 54-year-old male presents to the ER today for evaluation of polyuria and polydipsia for 2 weeks.  Clinically otherwise very well-appearing and asymptomatic.  Arrives tachycardic and appears dehydrated.  Blood sugar obtained and significantly elevated concerning for new onset diabetes.  Laboratory workup undertaken to assess for concurrent DKA with evidence of mild DKA including an anion gap metabolic acidosis with a bicarbonate of 18.  Suspect chronic insidious diabetes smoldering over time in the setting of poor diet and excessive soda use per patient report.  No evidence of additional acute process occurring.  Initiated resuscitation with IV lactated Ringer's and subcutaneous insulin lispro.  Case discussed with admitting hospitalist Dr. Ya who is comfortable with floor admission for subcutaneous insulin protocol.    Hydration: Based on the patient's presentation of Dehydration, DKA, and Tachycardia the patient was given IV fluids. IV Hydration was used because oral hydration was not adequate alone. Upon recheck following hydration, the patient was improved.          ADDITIONAL PROBLEMS MANAGED  Dehydration    DISPOSITION AND DISCUSSIONS  I have discussed management of the patient with the following physicians and NENA's: Hospitalist as above      FINAL DIAGNOSIS  1. Diabetic ketoacidosis without coma associated with type 2 diabetes mellitus (HCC)    2. Dehydration         Electronically signed by: Yoav Taylor M.D., 1/16/2025 10:44 PM

## 2025-01-17 NOTE — CARE PLAN
The patient is Stable - Low risk of patient condition declining or worsening    Shift Goals  Clinical Goals: diabetic education, monitor glucose  Patient Goals: education, home  Family Goals: manfred    Progress made toward(s) clinical / shift goals:  Patient able to verbalize needs and understanding of POC. RN to continue purposeful rounding and notify MD of any changes PRN    Patient is not progressing towards the following goals:

## 2025-01-17 NOTE — ED NOTES
Lab called with critical result of glucose at 596.  Dr. Taylor notified of critical lab result at 0029.

## 2025-01-17 NOTE — ED NOTES
Medication history reviewed with patient.  Med rec is complete  Allergies reviewed.     Patient denies any antibiotics/anticoagulants in the last 30 days      Karishma Muhammad PhT

## 2025-01-18 ENCOUNTER — PHARMACY VISIT (OUTPATIENT)
Dept: PHARMACY | Facility: MEDICAL CENTER | Age: 55
End: 2025-01-18
Payer: COMMERCIAL

## 2025-01-18 VITALS
HEART RATE: 81 BPM | SYSTOLIC BLOOD PRESSURE: 137 MMHG | BODY MASS INDEX: 30.44 KG/M2 | TEMPERATURE: 98.4 F | HEIGHT: 68 IN | WEIGHT: 200.84 LBS | RESPIRATION RATE: 17 BRPM | OXYGEN SATURATION: 94 % | DIASTOLIC BLOOD PRESSURE: 81 MMHG

## 2025-01-18 LAB
ANION GAP SERPL CALC-SCNC: 12 MMOL/L (ref 7–16)
BASOPHILS # BLD AUTO: 0.4 % (ref 0–1.8)
BASOPHILS # BLD: 0.03 K/UL (ref 0–0.12)
BUN SERPL-MCNC: 13 MG/DL (ref 8–22)
CALCIUM SERPL-MCNC: 8.6 MG/DL (ref 8.5–10.5)
CHLORIDE SERPL-SCNC: 99 MMOL/L (ref 96–112)
CO2 SERPL-SCNC: 22 MMOL/L (ref 20–33)
CREAT SERPL-MCNC: 0.72 MG/DL (ref 0.5–1.4)
EOSINOPHIL # BLD AUTO: 0.17 K/UL (ref 0–0.51)
EOSINOPHIL NFR BLD: 2.3 % (ref 0–6.9)
ERYTHROCYTE [DISTWIDTH] IN BLOOD BY AUTOMATED COUNT: 42.3 FL (ref 35.9–50)
GFR SERPLBLD CREATININE-BSD FMLA CKD-EPI: 107 ML/MIN/1.73 M 2
GLUCOSE BLD STRIP.AUTO-MCNC: 283 MG/DL (ref 65–99)
GLUCOSE BLD STRIP.AUTO-MCNC: 346 MG/DL (ref 65–99)
GLUCOSE SERPL-MCNC: 237 MG/DL (ref 65–99)
HCT VFR BLD AUTO: 44.1 % (ref 42–52)
HGB BLD-MCNC: 14.9 G/DL (ref 14–18)
IMM GRANULOCYTES # BLD AUTO: 0.03 K/UL (ref 0–0.11)
IMM GRANULOCYTES NFR BLD AUTO: 0.4 % (ref 0–0.9)
LYMPHOCYTES # BLD AUTO: 2.34 K/UL (ref 1–4.8)
LYMPHOCYTES NFR BLD: 31.7 % (ref 22–41)
MCH RBC QN AUTO: 29.9 PG (ref 27–33)
MCHC RBC AUTO-ENTMCNC: 33.8 G/DL (ref 32.3–36.5)
MCV RBC AUTO: 88.4 FL (ref 81.4–97.8)
MONOCYTES # BLD AUTO: 0.56 K/UL (ref 0–0.85)
MONOCYTES NFR BLD AUTO: 7.6 % (ref 0–13.4)
NEUTROPHILS # BLD AUTO: 4.26 K/UL (ref 1.82–7.42)
NEUTROPHILS NFR BLD: 57.6 % (ref 44–72)
NRBC # BLD AUTO: 0 K/UL
NRBC BLD-RTO: 0 /100 WBC (ref 0–0.2)
PLATELET # BLD AUTO: 277 K/UL (ref 164–446)
PMV BLD AUTO: 10.9 FL (ref 9–12.9)
POTASSIUM SERPL-SCNC: 3.8 MMOL/L (ref 3.6–5.5)
RBC # BLD AUTO: 4.99 M/UL (ref 4.7–6.1)
SODIUM SERPL-SCNC: 133 MMOL/L (ref 135–145)
WBC # BLD AUTO: 7.4 K/UL (ref 4.8–10.8)

## 2025-01-18 PROCEDURE — 99239 HOSP IP/OBS DSCHRG MGMT >30: CPT | Performed by: INTERNAL MEDICINE

## 2025-01-18 PROCEDURE — G0378 HOSPITAL OBSERVATION PER HR: HCPCS

## 2025-01-18 PROCEDURE — A9270 NON-COVERED ITEM OR SERVICE: HCPCS | Mod: UD | Performed by: INTERNAL MEDICINE

## 2025-01-18 PROCEDURE — 82962 GLUCOSE BLOOD TEST: CPT

## 2025-01-18 PROCEDURE — RXMED WILLOW AMBULATORY MEDICATION CHARGE

## 2025-01-18 PROCEDURE — 85025 COMPLETE CBC W/AUTO DIFF WBC: CPT

## 2025-01-18 PROCEDURE — 96372 THER/PROPH/DIAG INJ SC/IM: CPT

## 2025-01-18 PROCEDURE — 700102 HCHG RX REV CODE 250 W/ 637 OVERRIDE(OP): Mod: UD | Performed by: INTERNAL MEDICINE

## 2025-01-18 PROCEDURE — 80048 BASIC METABOLIC PNL TOTAL CA: CPT

## 2025-01-18 RX ORDER — METFORMIN HYDROCHLORIDE 500 MG/1
500 TABLET, EXTENDED RELEASE ORAL 2 TIMES DAILY
Qty: 62 TABLET | Refills: 0 | Status: SHIPPED | OUTPATIENT
Start: 2025-01-18 | End: 2025-02-18

## 2025-01-18 RX ORDER — ACETAMINOPHEN 325 MG/1
650 TABLET ORAL EVERY 6 HOURS PRN
Qty: 30 TABLET | Refills: 0 | Status: SHIPPED | OUTPATIENT
Start: 2025-01-18 | End: 2025-01-25

## 2025-01-18 RX ORDER — EMPAGLIFLOZIN 25 MG/1
25 TABLET, FILM COATED ORAL DAILY
Qty: 100 TABLET | Refills: 3 | Status: SHIPPED | OUTPATIENT
Start: 2025-01-18 | End: 2026-02-22

## 2025-01-18 RX ORDER — NICOTINE 21 MG/24HR
1 PATCH, TRANSDERMAL 24 HOURS TRANSDERMAL EVERY 24 HOURS
Qty: 7 PATCH | Refills: 0 | Status: SHIPPED | OUTPATIENT
Start: 2025-01-18 | End: 2025-01-25

## 2025-01-18 RX ORDER — INSULIN GLARGINE 100 [IU]/ML
22 INJECTION, SOLUTION SUBCUTANEOUS EVERY EVENING
Qty: 9 ML | Refills: 0 | Status: ACTIVE | OUTPATIENT
Start: 2025-01-18 | End: 2025-02-27

## 2025-01-18 RX ORDER — LOSARTAN POTASSIUM 25 MG/1
25 TABLET ORAL DAILY
Qty: 100 TABLET | Refills: 3 | Status: SHIPPED | OUTPATIENT
Start: 2025-01-18 | End: 2026-02-22

## 2025-01-18 RX ORDER — GLUCOSAMINE HCL/CHONDROITIN SU 500-400 MG
CAPSULE ORAL
Qty: 100 EACH | Refills: 0 | Status: SHIPPED | OUTPATIENT
Start: 2025-01-18

## 2025-01-18 RX ORDER — DIPHENHYDRAMINE HYDROCHLORIDE 25 MG/1
CAPSULE, LIQUID FILLED ORAL
Qty: 1 KIT | Refills: 0 | Status: SHIPPED | OUTPATIENT
Start: 2025-01-18

## 2025-01-18 RX ORDER — LANCETS 30 GAUGE
EACH MISCELLANEOUS
Qty: 100 EACH | Refills: 0 | Status: SHIPPED | OUTPATIENT
Start: 2025-01-18

## 2025-01-18 RX ADMIN — INSULIN LISPRO 7 UNITS: 100 INJECTION, SOLUTION INTRAVENOUS; SUBCUTANEOUS at 08:20

## 2025-01-18 RX ADMIN — INSULIN LISPRO 10 UNITS: 100 INJECTION, SOLUTION INTRAVENOUS; SUBCUTANEOUS at 11:27

## 2025-01-18 RX ADMIN — INSULIN LISPRO 5 UNITS: 100 INJECTION, SOLUTION INTRAVENOUS; SUBCUTANEOUS at 08:21

## 2025-01-18 RX ADMIN — ALPRAZOLAM 0.25 MG: 0.25 TABLET ORAL at 03:25

## 2025-01-18 RX ADMIN — INSULIN LISPRO 5 UNITS: 100 INJECTION, SOLUTION INTRAVENOUS; SUBCUTANEOUS at 11:25

## 2025-01-18 ASSESSMENT — PATIENT HEALTH QUESTIONNAIRE - PHQ9
SUM OF ALL RESPONSES TO PHQ9 QUESTIONS 1 AND 2: 0
2. FEELING DOWN, DEPRESSED, IRRITABLE, OR HOPELESS: NOT AT ALL
1. LITTLE INTEREST OR PLEASURE IN DOING THINGS: NOT AT ALL

## 2025-01-18 ASSESSMENT — PAIN DESCRIPTION - PAIN TYPE: TYPE: ACUTE PAIN

## 2025-01-18 ASSESSMENT — FIBROSIS 4 INDEX: FIB4 SCORE: 0.69

## 2025-01-18 NOTE — DISCHARGE INSTRUCTIONS
You were admitted to the hospital for very high blood glucose. You were found to have uncontrolled Diabetes Mellitus and were started on Insulin and Intravenous fluids. Your condition improved overtime. You are being discharged on Insulin, Metformin, and Jardiance for uncontrolled high blood glucose in the setting of Diabetes and Losartan for High blood pressure.  You should come to the hospital if you develop the following signs/symptoms:  -Chest Pain  -Worsening Abdominal Pain  -Fever/Chills  -Worsening Confusion  -Diarrhea     PCP to monitor glycemic control with HbA1C every 3 months

## 2025-01-18 NOTE — NON-PROVIDER
[facility] [place] HISTORY AND PHYSICAL     PATIENT ID:  NAME:  Star Gonzalez  MRN:               [id number]  YOB: 1970    Date of Admission: 1/16/2025     Attending: Dr. Kenia Walsh     Resident: Dr. Lj Rodriguez PGY2    CC:  new onset diabetes     HPI: Star Gonzalez is a 54 y.o. male with a history of tobacco use who presented on 1/16/2025 with generalized weakness, polydipsia, and polyuria x 2 weeks and was found to be in DKA on admission.     This morning, patient reports poor sleep. Interval history limited due to patient participation. He reports feeling well and denies abdominal pain, nausea, vomiting, diarrhea, dysuria, urinary frequency, confusion, dizziness, or fevers.     ER Course:  Presented to the ED on 1/16/2025, evaluated by ERP Dr. Taylor for polyuria and polydipsia x 2 weeks. Arrived tachycardic to 108 and hypertensive to 156/106 mmHg, appearing dehydrated. Blood sugar was significantly elevated >600 and laboratory workup revealed an anion gap metabolic acidosis with a bicarbonate of 18 and anion gap 20. Sodium 124 and corrected sodium 133. UA showed ketones and glucose, no bacteria or leukocyte esterase. Resuscitation with IV lactated Ringer's 2 L, 10 units subcutaneous insulin lispro, and potassium 40 mg was initiated. Patient's case was discussed with hospitalist Dr. Ya and he was admitted for subcutaneous insulin protocol.     REVIEW OF SYSTEMS:   Ten systems reviewed and were negative except as noted in the HPI.                PAST MEDICAL HISTORY:  History reviewed. No pertinent past medical history.    PAST SURGICAL HISTORY:  History reviewed. No pertinent surgical history.    FAMILY HISTORY:  History reviewed. No pertinent family history.      DIET:   Orders Placed This Encounter   Procedures    Diet Order Diet: Consistent CHO (Diabetic)     Standing Status:   Standing     Number of Occurrences:   1     Order Specific Question:   Diet:     Answer:   Consistent CHO (Diabetic) [4]        ALLERGIES:  No Known Allergies    OUTPATIENT MEDICATIONS:    Current Facility-Administered Medications:     enoxaparin (Lovenox) inj 40 mg, 40 mg, Subcutaneous, DAILY AT 1800, Suman Ya M.D.    acetaminophen (Tylenol) tablet 650 mg, 650 mg, Oral, Q6HRS PRN, Suman Ya M.D., 650 mg at 01/17/25 7905    Notify provider if pain remains uncontrolled, , , CONTINUOUS **AND** Use the Numeric Rating Scale (NRS), Brandon-Baker Faces (WBF), or FLACC on regular floors and Critical-Care Pain Observation Tool (CPOT) on ICUs/Trauma to assess pain, , , CONTINUOUS **AND** Pulse Ox, , , CONTINUOUS **AND** Pharmacy Consult Request ...Pain Management Review 1 Each, 1 Each, Other, PHARMACY TO DOSE **AND** If patient difficult to arouse and/or has respiratory depression (respiratory rate of 10 or less), stop any opiates that are currently infusing and call a Rapid Response., , , CONTINUOUS, Suman Ya M.D.    senna-docusate (Pericolace Or Senokot S) 8.6-50 MG per tablet 2 Tablet, 2 Tablet, Oral, Q EVENING **AND** polyethylene glycol/lytes (Miralax) Packet 1 Packet, 1 Packet, Oral, QDAY PRN, Suman Ya M.D.    hydrALAZINE (Apresoline) injection 10 mg, 10 mg, Intravenous, Q4HRS PRN, Suman Ya M.D.    ondansetron (Zofran) syringe/vial injection 4 mg, 4 mg, Intravenous, Q4HRS PRN, Suman Ya M.D.    ondansetron (Zofran ODT) dispertab 4 mg, 4 mg, Oral, Q4HRS PRN, Suman Ya M.D.    promethazine (Phenergan) tablet 12.5-25 mg, 12.5-25 mg, Oral, Q4HRS PRN, Suman Ya M.D.    promethazine (Phenergan) suppository 12.5-25 mg, 12.5-25 mg, Rectal, Q4HRS PRN, Suman Ya M.D.    prochlorperazine (Compazine) injection 5-10 mg, 5-10 mg, Intravenous, Q4HRS PRN, Suman Ya M.D.    insulin GLARGINE (Lantus,Semglee) injection, 0.2 Units/kg/day, Subcutaneous, Q EVENING, 22 Units at 01/17/25 1708 **AND** [DISCONTINUED] insulin lispro (HumaLOG,AdmeLOG) subcutaneous injection, 2-9 Units,  Subcutaneous, Q6HRS, 9 Units at 25 1324 **AND** POC blood glucose manual result, , , Q6H **AND** NOTIFY MD and PharmD, , , Once **AND** Administer 20 grams of glucose (approximately 8 ounces of fruit juice) every 15 minutes PRN FSBG less than 70 mg/dL, , , PRN **AND** dextrose 50% (D50W) injection 25 g, 25 g, Intravenous, Q15 MIN PRN, Suman Ya M.D.    nicotine (Nicoderm) 21 MG/24HR 21 mg, 21 mg, Transdermal, Daily-0600, Suman Ya M.D., 21 mg at 25 0217    ALPRAZolam (Xanax) tablet 0.25 mg, 0.25 mg, Oral, HS PRN, Suman Ya M.D., 0.25 mg at 25 0325    insulin lispro (HumaLOG,AdmeLOG) subcutaneous injection, 5 Units, Subcutaneous, TID AC, Hodan Gonsalez M.D., 5 Units at 25 1712    metFORMIN (Glucophage) tablet 500 mg, 500 mg, Oral, BID WITH MEALS, Hodan Gonsalez M.D., 500 mg at 25 1708    insulin lispro (HumaLOG,AdmeLOG) subcutaneous injection, 3-14 Units, Subcutaneous, 4X/DAY ACHS, Hodan Gonsalez M.D., 12 Units at 25 2157    PHYSICAL EXAM:  Vitals:    25 1521 25 2056 25 0138 25 0423   BP: (!) 137/90 119/80 112/81 137/81   Pulse: 92 (!) 106 88 77   Resp:    Temp: 36.7 °C (98.1 °F) 36.9 °C (98.4 °F) 36.7 °C (98.1 °F) 37 °C (98.6 °F)   TempSrc: Temporal Temporal Temporal Temporal   SpO2: 94% 95% 95% 96%   Weight:    91.1 kg (200 lb 13.4 oz)   Height:       , Temp (24hrs), Av.8 °C (98.3 °F), Min:36.7 °C (98.1 °F), Max:37 °C (98.6 °F)  , Pulse Oximetry: 96 %, O2 (LPM): 0, O2 Delivery Device: None - Room Air    General: Pt resting in NAD, cooperative   HEENT: Normocephalic, atraumatic.   Neck:  Supple without lymphadenopathy or rigidity.   Lungs:  Symmetrical.  Clear to auscultation bilaterally.   Cardiovascular:  Regular rate and rhythm without murmurs.   Abdomen:  Abdomen is soft, nontender, nondistended. No masses noted.  Extremities:  No gross deformities noted. 2+ pulses in all extremities. No edema   Spine:   Straight without vertebral anomalies.  CNS:  Muscle tone is normal. No gross focal neurologic deficits    LAB TESTS:   Recent Labs     01/16/25 2202 01/18/25  0315   WBC 8.9 7.4   RBC 5.49 4.99   HEMOGLOBIN 16.4 14.9   HEMATOCRIT 47.8 44.1   MCV 87.1 88.4   MCH 29.9 29.9   RDW 40.8 42.3   PLATELETCT 336 277   MPV 10.6 10.9   NEUTSPOLYS 67.20 57.60   LYMPHOCYTES 24.30 31.70   MONOCYTES 6.70 7.60   EOSINOPHILS 0.80 2.30   BASOPHILS 0.50 0.40         Recent Labs     01/16/25 2202 01/17/25  0525 01/17/25  1324 01/18/25  0315   SODIUM 124* 132* 126* 133*   POTASSIUM 4.4 3.7 4.4 3.8   CHLORIDE 86* 98 94* 99   CO2 18* 25 18* 22   BUN 19 16 20 13   CREATININE 0.79 0.86 0.80 0.72   CALCIUM 9.2 8.6 8.3* 8.6   MAGNESIUM 2.2  --   --   --    PHOSPHORUS 4.4  --   --   --    ALBUMIN 4.2  --   --   --        CULTURES:   Results       Procedure Component Value Units Date/Time    URINALYSIS [[id number]]  (Abnormal) Collected: 01/16/25 2154    Order Status: Completed Specimen: Urine Updated: 01/16/25 2207     Color Yellow     Character Clear     Specific Gravity 1.036     Ph 5.0     Glucose >=1000 mg/dL      Ketones 15 mg/dL      Protein Negative mg/dL      Bilirubin Negative     Urobilinogen, Urine 0.2 EU/dL      Nitrite Negative     Leukocyte Esterase Negative     Occult Blood Negative     Micro Urine Req see below     Comment: Microscopic examination not performed when specimen is clear  and chemically negative for protein, blood, leukocyte esterase  and nitrite.                 IMAGES:  No orders to display       CONSULTS:   None    ASSESSMENT/PLAN:   Star Gonzalez is a 54 y.o. male with a history of tobacco use who presented on 1/16/2025 with generalized weakness, polydipsia, and polyuria x 2 weeks and was found to be in DKA on admission.     Diabetic ketoacidosis, uncontrolled type 2 diabetes mellitus  New diagnosis. A1c 17.8%. Initial blood glucose 596, improving to 237. Metabolic acidosis resolved after LR 2 L. Bicarb 22,  anion gap closed at 12.   -Continue metformin 500 mg BID, Lantus 22 units nightly  -Initiate Jardiance 25 mg QD  -Encouraged lifestyle modification  -Diabetic education given     Smoking  -Encouraged smoking cessation.  -Continue nicotine patch.     Hypertension   BP elevated during hospital course 113-156/ mmHg.  -Initiate Losartan 25 mg QD for blood pressure control and added renal protection     Health Maintenance  -Encouraged lifestyle modification  -Candidate for ASA 81 mg QD and statin given elevated ASCVD risk    Core Measures:   Fluids: None  Lines: PIV  Abx: None   DVT prophylaxis: Lovenox injection   Code Status: Full code     Disposition: Patient desires to leave, he will be discharged today since a plan for diabetic management is in place.

## 2025-01-18 NOTE — PROGRESS NOTES
Patient arrived to discharge lounge. PIV removed, tip intact. Discussed discharge paperwork and medications with patient. Answered all questions. No home meds to return, M2B given to patient. Personal belongings in hand, ambulated out.

## 2025-01-18 NOTE — PROGRESS NOTES
4 Eyes Skin Assessment Completed by TYSHAWN Chavez and TYSHAWN Alejandro.    Head WDL  Ears WDL  Nose WDL  Mouth WDL  Neck WDL  Breast/Chest WDL  Shoulder Blades WDL  Spine WDL  (R) Arm/Elbow/Hand WDL  (L) Arm/Elbow/Hand WDL  Abdomen WDL  Groin WDL  Scrotum/Coccyx/Buttocks WDL  (R) Leg WDL  (L) Leg WDL  (R) Heel/Foot/Toe WDL  (L) Heel/Foot/Toe WDL          Devices In Places Tele Box      Interventions In Place N/A    Possible Skin Injury No    Pictures Uploaded Into Epic N/A  Wound Consult Placed N/A  RN Wound Prevention Protocol Ordered No

## 2025-01-18 NOTE — CONSULTS
Diabetes education: Pt is newly dx with diabetes, admitted with blood sugar of 596, and Hga1c of 17.8%. Pt is currently on Glargine 22 units pm and Lispro per sliding scale ac and hs ( increased and changed from every six hours), and Lispro 5 units tid meals. Blood sugars are 335 ( 6 units),  463 (9 units), and 326 ( 10 +5 units). Pt was also started on Metformin ( briefly reviewed).  Met with pt this afternoon. Discussed what diabetes was,  type two, hypoglycemia,  hyperglycemia, and goals for blood sugars.  Discussed need for carbohydrates and proteins, with every meal and why.  Discussed need, benefit and precautions with exercise.  Discussed  what effects blood sugars. Discussed need to follow up with his PCP. Pt will need a PCP at discharge.  Insulin was discussed as well, insulin storage, shelf life and site rotation. Pt practiced with saline pens, and practice device.  Reviewed finger sticks and meter verbally.  Plan: Pt will need to stick his finger and give his insulin with nursing. Pt may also benefit in learning to draw up from a vial as his insurance has required a vial for fast acting and pen for long acting. Kathy VILLAGRAN agrees to work with pt on this.   Pt will need a One touch verio flex meter, strips and lancets, Lantus pens, Lispro pens ( though may need vials), and pen needles ( if need vials, 3/10 cc syringes should also be ordered). Please use diabetes supplies, F2 for correct meter and dx code and send to [facility] at discharge.  [facility] DM book and handouts on pens and vials given to pt with number for questions. CDE will not be available again until 1/27/25.

## 2025-01-18 NOTE — CARE PLAN
The patient is Stable - Low risk of patient condition declining or worsening    Shift Goals  Clinical Goals: education on DM  Patient Goals: home  Family Goals: manfred    Progress made toward(s) clinical / shift goals:  Patient continues to verbalize needs and understanding of POC. RN to continue purposeful rounding and notify MD of any changes PRN    Patient is not progressing towards the following goals:

## 2025-01-18 NOTE — CARE PLAN
The patient is Stable - Low risk of patient condition declining or worsening    Shift Goals  Clinical Goals: Educaion on new DMx2  Patient Goals: Discharge  Family Goals: ROSEANNE    Progress made toward(s) clinical / shift goals:    Problem: Knowledge Deficit - Standard  Goal: Patient and family/care givers will demonstrate understanding of plan of care, disease process/condition, diagnostic tests and medications  Outcome: Progressing     Problem: Diabetes Management  Goal: Patient will achieve and maintain glucose in satisfactory range  Outcome: Progressing     NOTE: patient understands Tx plan and cooperative with Tx.   Patient is not progressing towards the following goals:

## 2025-01-18 NOTE — DISCHARGE SUMMARY
[facility] Internal Medicine Discharge Summary    Attending: Kenia Walsh M.d.  Senior Resident: Dr. Lj Rodriguez  Intern:  Dr. Geovanna Gallardo  Contact Number: [phone]    CHIEF COMPLAINT ON ADMISSION  Chief Complaint   Patient presents with    Other     Pt reports concern for new onset diabetes. Pt reports thirst, generalized abdominal discomfort intermittent positional, difficulty eating d/t loss of appetite, urinary frequency, feeling fatigued symptoms started x4 wks ago.   Pt has No PCP       Reason for Admission  Abdominal Pain, fatigue     Admission Date  1/16/2025    CODE STATUS  Full Code    HPI & HOSPITAL COURSE     Star Gonzalez is a 54 year old male with past medical history of tobacco use disorder who presented 1/16/2025 with complaints of generalized weakness, vague diffuse abdominal discomfort, polydipsia, polyuria, and decreased appetite for 2 weeks. On admission, he was tachycardic and appeared dehydrated. Blood work-up showed elevated Blood Glucose of 596, elevated anion gap of 20 with low Bicarbonate of 18 and he was admitted for Hyperosmolar hyperglycemic state in the setting of severe hyperglycemia. . He was fluid resuscitated with Ringer Lactate 2 L and was given 10 Units of Insulin Lispro subcutaneously. His condition improved, serum bicarbonate has normalized and anion gap has closed down.     His HbA1C is elevated at 17.8. He was started on Insulin Glargine 22 Units at bedtime, Insulin Lispro 5 Units TID and Insulin Sliding Scale. He was seen by diabetic health educator and they counseled him on management of Diabetes. He is being discharged on Insulin Glargine 22 units at bedtime, Metformin 500 mg BID, and Empagliflozin 25 mg OD for better glycemic control. He is also started on Losartan 25 mg for hypertension. A PCP appointment has been placed for him and He will follow up with a primary care physician in the outpatient setting.    Therefore, he is discharged in good and stable condition to home with close  outpatient follow-up.    The patient met 2-midnight criteria for an inpatient stay at the time of discharge.    Discharge Date  1/18/2025    Physical Exam on Day of Discharge  Physical Exam  HENT:      Mouth/Throat:      Mouth: Mucous membranes are moist.   Eyes:      Pupils: Pupils are equal, round, and reactive to light.   Cardiovascular:      Rate and Rhythm: Normal rate and regular rhythm.      Heart sounds: No murmur heard.  Pulmonary:      Effort: No respiratory distress.      Breath sounds: No stridor. No wheezing or rhonchi.   Abdominal:      General: There is no distension.      Tenderness: There is no abdominal tenderness. There is no guarding.   Neurological:      Mental Status: Mental status is at baseline.         FOLLOW UP ITEMS POST DISCHARGE  -PCP to monitor glycemic control with HbA1C every 3 months    DISCHARGE DIAGNOSES  Principal Problem:    Diabetes mellitus type 2 with ketoacidosis, uncontrolled (HCC) (POA: Yes)  Active Problems:    Hypertension (POA: Unknown)    Smoking (POA: Unknown)    Hyponatremia (POA: Unknown)  Resolved Problems:    * No resolved hospital problems. *      FOLLOW UP  No future appointments.  No follow-up provider specified.    MEDICATIONS ON DISCHARGE     Medication List        START taking these medications        Instructions   acetaminophen 325 MG Tabs  Commonly known as: Tylenol   Take 2 Tablets by mouth every 6 hours as needed for Moderate Pain for up to 7 days.  Dose: 650 mg     Alcohol Swabs   Doctor's comments: Per formulary preference. ICD-10 code: E11.65 Uncontrolled type 2 Diabetes Mellitus  Wipe site with prep pad prior to injection.     * Blood Glucose Meter Kit   Doctor's comments: Or per formulary preference. ICD-10 code: E11.65 Uncontrolled type 2 Diabetes Mellitus  Test blood sugar as recommended by provider. One Touch Verio Flex blood glucose monitoring kit.     * Blood Glucose Test Strips   Doctor's comments: Or per formulary preference. ICD-10 code:  E11.65 Uncontrolled type 2 Diabetes Mellitus  Use one One Touch Verio Flex strip to test blood sugar three times daily before meals.     Insulin Pen Needle 32 G x 4 mm   Doctor's comments: Per patient/formulary preference. ICD-10 code: E11.65 Uncontrolled type 2 Diabetes Mellitus  Use one pen needle in pen device to inject insulin once daily.     Jardiance 25 MG Tabs  Generic drug: Empagliflozin   Take 25 mg by mouth every day.  Dose: 25 mg     Lancets   Doctor's comments: Or per formulary preference. ICD-10 code: E11.65 Uncontrolled type 2 Diabetes Mellitus  Use one One Touch Verio Flex lancet to test blood sugar three times daily before meals.     Lantus SoloStar 100 UNIT/ML Sopn injection  Generic drug: insulin glargine   Inject 22 Units under the skin every evening for 31 days.  Dose: 22 Units     losartan 25 MG Tabs  Commonly known as: Cozaar   Take 1 Tablet by mouth every day.  Dose: 25 mg     metFORMIN  MG Tb24  Commonly known as: Glucophage XR   Take 1 Tablet by mouth 2 times a day for 31 days.  Dose: 500 mg     nicotine 21 MG/24HR Pt24  Commonly known as: Nicoderm   Place 1 Patch on the skin every 24 hours for 7 days.  Dose: 1 Patch           * This list has 2 medication(s) that are the same as other medications prescribed for you. Read the directions carefully, and ask your doctor or other care provider to review them with you.                STOP taking these medications      ibuprofen 200 MG Tabs  Commonly known as: Motrin              Allergies  No Known Allergies    DIET  Orders Placed This Encounter   Procedures    Diet Order Diet: Consistent CHO (Diabetic)     Standing Status:   Standing     Number of Occurrences:   1     Order Specific Question:   Diet:     Answer:   Consistent CHO (Diabetic) [4]       ACTIVITY  As tolerated.  Weight bearing as tolerated    CONSULTATIONS      PROCEDURES      LABORATORY  Lab Results   Component Value Date    SODIUM 133 (L) 01/18/2025    POTASSIUM 3.8 01/18/2025     CHLORIDE 99 01/18/2025    CO2 22 01/18/2025    GLUCOSE 237 (H) 01/18/2025    BUN 13 01/18/2025    CREATININE 0.72 01/18/2025        Lab Results   Component Value Date    WBC 7.4 01/18/2025    HEMOGLOBIN 14.9 01/18/2025    HEMATOCRIT 44.1 01/18/2025    PLATELETCT 277 01/18/2025        Total time of the discharge process exceeds 35 minutes.

## 2025-01-18 NOTE — PROGRESS NOTES
Diabetes education: Met with pt this afternoon. Please see consult note.  Plan: Pt will need to stick his finger and give his insulin with nursing. Pt may also benefit in learning to draw up from a vial as his insurance has required a vial for fast acting and pen for long acting. Kathy VILLAGRAN agrees to work with pt on this.   Pt will need a One touch verio flex meter, strips and lancets, Lantus pens, Lispro pens ( though may need vials), and pen needles ( if need vials, 3/10 cc syringes should also be ordered). Please use diabetes supplies, F2 for correct meter and dx code and send to [facility] pharmacy [place] at discharge.  [facility] DM book and handouts on pens and vials given to pt with number for questions. CDE will not be available again until 1/27/25.

## 2025-02-12 ENCOUNTER — TELEPHONE (OUTPATIENT)
Dept: SCHEDULING | Facility: IMAGING CENTER | Age: 55
End: 2025-02-12
Payer: MEDICAID

## 2025-02-18 ENCOUNTER — TELEPHONE (OUTPATIENT)
Dept: SCHEDULING | Facility: IMAGING CENTER | Age: 55
End: 2025-02-18
Payer: MEDICAID

## 2025-02-25 ENCOUNTER — PATIENT MESSAGE (OUTPATIENT)
Dept: SCHEDULING | Facility: IMAGING CENTER | Age: 55
End: 2025-02-25
Payer: MEDICAID